# Patient Record
Sex: FEMALE | Race: WHITE | NOT HISPANIC OR LATINO | Employment: OTHER | URBAN - NONMETROPOLITAN AREA
[De-identification: names, ages, dates, MRNs, and addresses within clinical notes are randomized per-mention and may not be internally consistent; named-entity substitution may affect disease eponyms.]

---

## 2017-09-20 ENCOUNTER — OFFICE VISIT (OUTPATIENT)
Dept: SURGERY | Facility: CLINIC | Age: 52
End: 2017-09-20

## 2017-09-20 VITALS
HEART RATE: 72 BPM | WEIGHT: 168 LBS | DIASTOLIC BLOOD PRESSURE: 80 MMHG | BODY MASS INDEX: 29.77 KG/M2 | RESPIRATION RATE: 16 BRPM | SYSTOLIC BLOOD PRESSURE: 122 MMHG | HEIGHT: 63 IN

## 2017-09-20 DIAGNOSIS — R07.9 CHEST PAIN, UNSPECIFIED TYPE: ICD-10-CM

## 2017-09-20 DIAGNOSIS — R14.0 BLOATING: ICD-10-CM

## 2017-09-20 DIAGNOSIS — R10.13 EPIGASTRIC PAIN: ICD-10-CM

## 2017-09-20 DIAGNOSIS — R10.11 RUQ PAIN: Primary | ICD-10-CM

## 2017-09-20 DIAGNOSIS — R19.7 DIARRHEA, UNSPECIFIED TYPE: ICD-10-CM

## 2017-09-20 PROCEDURE — 99204 OFFICE O/P NEW MOD 45 MIN: CPT | Performed by: SURGERY

## 2017-09-20 RX ORDER — MELOXICAM 15 MG/1
15 TABLET ORAL DAILY
COMMUNITY

## 2017-09-20 RX ORDER — GABAPENTIN 300 MG/1
600 CAPSULE ORAL 3 TIMES DAILY
COMMUNITY

## 2017-09-20 RX ORDER — ALBUTEROL SULFATE 90 UG/1
2 AEROSOL, METERED RESPIRATORY (INHALATION) EVERY 4 HOURS PRN
COMMUNITY

## 2017-09-20 RX ORDER — OXYCODONE HYDROCHLORIDE AND ACETAMINOPHEN 5; 325 MG/1; MG/1
1 TABLET ORAL EVERY 6 HOURS PRN
COMMUNITY

## 2017-09-20 RX ORDER — CHLORAL HYDRATE 500 MG
CAPSULE ORAL
COMMUNITY

## 2017-09-20 RX ORDER — CALCIUM CARBONATE 300MG(750)
TABLET,CHEWABLE ORAL
COMMUNITY

## 2017-09-20 RX ORDER — NITROGLYCERIN 0.4 MG/1
0.4 TABLET SUBLINGUAL
COMMUNITY

## 2017-09-20 RX ORDER — TRIAMTERENE AND HYDROCHLOROTHIAZIDE 75; 50 MG/1; MG/1
1 TABLET ORAL DAILY
COMMUNITY

## 2017-09-20 RX ORDER — METOPROLOL TARTRATE 50 MG/1
50 TABLET, FILM COATED ORAL 2 TIMES DAILY
COMMUNITY

## 2017-09-20 NOTE — PROGRESS NOTES
"Marya Campos 52 y.o. female presents @ the req of Dr. Hester for eval of dysphagia, bloating, heartburn, and reflux.  Pt reports no c-scope for greater than 10 years.        HPI     This very pleasant patient has been having multiple problems.  She has been having postprandial abdominal pain and bloating.  She also has diarrhea for many years.  She has been having swallowing.  She feels like food gets stuck in her epigastric area.  She also has issues with chest pain.  She had some sort of heart test that revealed a large number of irregular heart beats.  She has severe heartburn.  She has not seen a cardiologist in 2-3 years.  She also gets headaches from a pinched nerve in her neck.    Review of Systems   All other systems reviewed and are negative.          Past Medical History:   Diagnosis Date   • DDD (degenerative disc disease), cervical    • GERD (gastroesophageal reflux disease)    • Headache    • Hyperlipidemia    • Hypertension    • Seasonal allergies            Past Surgical History:   Procedure Laterality Date   • COLONOSCOPY     • ENDOSCOPY     • HYSTERECTOMY             Physical Exam   Constitutional: She is oriented to person, place, and time. She appears well-developed and well-nourished.   HENT:   Head: Normocephalic and atraumatic.   Neck: Neck supple.   Cardiovascular: Normal rate and regular rhythm.    Pulmonary/Chest: Effort normal and breath sounds normal.   Abdominal: Soft. Bowel sounds are normal.   ruq and epigastric pain   Musculoskeletal: She exhibits no edema.   Neurological: She is alert and oriented to person, place, and time.   Skin: Skin is warm and dry.   Psychiatric: She has a normal mood and affect. Her behavior is normal.   Nursing note and vitals reviewed.          /80  Pulse 72  Resp 16  Ht 63\" (160 cm)  Wt 168 lb (76.2 kg)  BMI 29.76 kg/m2        Marya was seen today for difficulty swallowing.    Diagnoses and all orders for this visit:    RUQ pain    Epigastric " pain    Bloating    Chest pain, unspecified type    Diarrhea, unspecified type      We will schedule Marya for an EGD, colonoscopy, gallbladder ultrasound and have her see Dr. King for cardiac clearance.  I discussed with the patient the benefits and risks of performing endoscopy.  Benefits and risks were not limited to but including bleeding, infection, perforation, complications of anesthesia, aspiration.  The patient appeared to understand and is willing to proceed.    Thank you for allowing me to participate in the care of this interesting patient.

## 2017-09-20 NOTE — H&P
"Marya Capmos 52 y.o. female presents @ the req of Dr. Hester for eval of dysphagia, bloating, heartburn, and reflux.  Pt reports no c-scope for greater than 10 years.        HPI     This very pleasant patient has been having multiple problems.  She has been having postprandial abdominal pain and bloating.  She also has diarrhea for many years.  She has been having swallowing.  She feels like food gets stuck in her epigastric area.  She also has issues with chest pain.  She had some sort of heart test that revealed a large number of irregular heart beats.  She has severe heartburn.  She has not seen a cardiologist in 2-3 years.  She also gets headaches from a pinched nerve in her neck.    Review of Systems   All other systems reviewed and are negative.          Past Medical History:   Diagnosis Date   • DDD (degenerative disc disease), cervical    • GERD (gastroesophageal reflux disease)    • Headache    • Hyperlipidemia    • Hypertension    • Seasonal allergies            Past Surgical History:   Procedure Laterality Date   • COLONOSCOPY     • ENDOSCOPY     • HYSTERECTOMY             Physical Exam   Constitutional: She is oriented to person, place, and time. She appears well-developed and well-nourished.   HENT:   Head: Normocephalic and atraumatic.   Neck: Neck supple.   Cardiovascular: Normal rate and regular rhythm.    Pulmonary/Chest: Effort normal and breath sounds normal.   Abdominal: Soft. Bowel sounds are normal.   ruq and epigastric pain   Musculoskeletal: She exhibits no edema.   Neurological: She is alert and oriented to person, place, and time.   Skin: Skin is warm and dry.   Psychiatric: She has a normal mood and affect. Her behavior is normal.   Nursing note and vitals reviewed.          /80  Pulse 72  Resp 16  Ht 63\" (160 cm)  Wt 168 lb (76.2 kg)  BMI 29.76 kg/m2        Marya was seen today for difficulty swallowing.    Diagnoses and all orders for this visit:    RUQ pain    Epigastric " pain    Bloating    Chest pain, unspecified type    Diarrhea, unspecified type      We will schedule Marya for an EGD, colonoscopy, gallbladder ultrasound and have her see Dr. King for cardiac clearance.  I discussed with the patient the benefits and risks of performing endoscopy.  Benefits and risks were not limited to but including bleeding, infection, perforation, complications of anesthesia, aspiration.  The patient appeared to understand and is willing to proceed.    Thank you for allowing me to participate in the care of this interesting patient.

## 2017-09-21 DIAGNOSIS — R10.11 RUQ PAIN: Primary | ICD-10-CM

## 2017-09-26 ENCOUNTER — OFFICE VISIT (OUTPATIENT)
Dept: CARDIOLOGY | Facility: CLINIC | Age: 52
End: 2017-09-26

## 2017-09-26 VITALS
BODY MASS INDEX: 29.77 KG/M2 | DIASTOLIC BLOOD PRESSURE: 67 MMHG | WEIGHT: 168 LBS | HEART RATE: 82 BPM | SYSTOLIC BLOOD PRESSURE: 102 MMHG | HEIGHT: 63 IN

## 2017-09-26 DIAGNOSIS — R07.2 PRECORDIAL PAIN: Primary | ICD-10-CM

## 2017-09-26 DIAGNOSIS — I45.9 SKIPPED BEATS: ICD-10-CM

## 2017-09-26 PROCEDURE — 99203 OFFICE O/P NEW LOW 30 MIN: CPT | Performed by: INTERNAL MEDICINE

## 2017-09-26 PROCEDURE — 93000 ELECTROCARDIOGRAM COMPLETE: CPT | Performed by: INTERNAL MEDICINE

## 2017-09-26 NOTE — PROGRESS NOTES
" Subjective:        CC  Clearance for scope    Skip beats    Occasional cp         Marya Campos is a 52 y.o. female who  Is here for the cardiac evaluation as well as clearance for the scopes has been complaining of skipped beats as well as occasional L aching sensations mild in intensity retrosternal with mild shortness of breath intermittent for several months Cardiac risk factors: hypertension and smoking/ tobacco exposure.    Past Medical History:   Diagnosis Date   • DDD (degenerative disc disease), cervical    • GERD (gastroesophageal reflux disease)    • Headache    • Hyperlipidemia    • Hypertension    • Seasonal allergies     reports that she has been smoking Cigarettes.  She has never used smokeless tobacco. She reports that she does not drink alcohol.  Family History   Problem Relation Age of Onset   • Cancer Mother    • Heart disease Maternal Uncle    • Hypertension Maternal Grandfather         Review of Systems  Constitutional: No wt loss, fever   Gastrointestinal: No nausea , abdominal pain  Behavioral/Psych: No insomnia or anxiety  Cardiovascular ----positive for Palpitation. All other systems reviewed and are negative    Objective:       Physical Exam             Physical Exam  /67  Pulse 82  Ht 63\" (160 cm)  Wt 168 lb (76.2 kg)  BMI 29.76 kg/m2    General appearance: NAD, conversant   Eyes: anicteric sclerae, moist conjunctivae; no lid-lag; PERRLA   HENT: Atraumatic; oropharynx clear with moist mucous membranes and no mucosal ulcerations;  normal hard and soft palate   Neck: Trachea midline; FROM, supple, no thyromegaly or lymphadenopathy   Lungs: CTA, with normal respiratory effort and no intercostal retractions   CV: S1-S2 regular, no murmurs, no rub, no gallop   Abdomen: Soft, non-tender; no masses or HSM   Extremities: No peripheral edema or extremity lymphadenopathy  Skin: Normal temperature, turgor and texture; no rash, ulcers or subcutaneous nodules   Psych: Appropriate affect, alert " and oriented to person, place and time           Cardiographics  @  ECG 12 Lead  Date/Time: 9/26/2017 4:30 PM  Performed by: LUCERO DOVE  Authorized by: LUCERO DOVE   Comparison: not compared with previous ECG   Previous ECG: no previous ECG available  Rhythm: sinus rhythm  ST Flattening: all  Clinical impression: non-specific ECG            Echocardiogram:     Imaging  Chest x-ray: not done     Lab Review   not applicable       Current Outpatient Prescriptions:   •  albuterol (PROVENTIL HFA;VENTOLIN HFA) 108 (90 Base) MCG/ACT inhaler, Inhale 2 puffs Every 4 (Four) Hours As Needed for Wheezing., Disp: , Rfl:   •  esomeprazole (nexIUM) 20 MG capsule, Take 20 mg by mouth Every Morning Before Breakfast., Disp: , Rfl:   •  estrogens, conjugated, (PREMARIN) 0.9 MG tablet, Take 0.9 mg by mouth Daily. Take daily for 21 days then do not take for 7 days., Disp: , Rfl:   •  gabapentin (NEURONTIN) 300 MG capsule, Take 600 mg by mouth 3 (Three) Times a Day., Disp: , Rfl:   •  Magnesium 400 MG tablet, Take  by mouth. One tab every two days, Disp: , Rfl:   •  meloxicam (MOBIC) 15 MG tablet, Take 15 mg by mouth Daily., Disp: , Rfl:   •  metoprolol tartrate (LOPRESSOR) 50 MG tablet, Take 50 mg by mouth 2 (Two) Times a Day., Disp: , Rfl:   •  NIACIN CR PO, Take 1,000 mg by mouth., Disp: , Rfl:   •  nitroglycerin (NITROSTAT) 0.4 MG SL tablet, Place 0.4 mg under the tongue Every 5 (Five) Minutes As Needed for Chest Pain. Take no more than 3 doses in 15 minutes., Disp: , Rfl:   •  Omega-3 Fatty Acids (FISH OIL) 1000 MG capsule capsule, Take  by mouth Daily With Breakfast., Disp: , Rfl:   •  oxyCODONE-acetaminophen (PERCOCET) 5-325 MG per tablet, Take 1 tablet by mouth Every 6 (Six) Hours As Needed., Disp: , Rfl:   •  Potassium Chloride Brit CR (POTASSIUM CHLORIDE BRIT ER PO), Take 10 mEq by mouth., Disp: , Rfl:   •  triamterene-hydrochlorothiazide (MAXZIDE) 75-50 MG per tablet, Take 1 tablet by mouth Daily., Disp: ,  Rfl:         Assessment:      No diagnosis found.    Patient Active Problem List   Diagnosis   • Bloating   • Epigastric pain   • RUQ pain   • Chest pain   • Diarrhea   CONCLUSION(S):   1. Angiographically normal coronaries.  2. Preserved LV systolic function.    RECOMMENDATION(S): Medical therapy, continued low dose beta blocker for   palpitations      Plan:          1. Precordial pain  Considering the patient's symptoms as well as clinical situation and  EKG findings, along with cardiac risk factors, ischemic workup is necessary to rule out ischemic cardiomyopathy, stress induced arrhythmias, and functional capacity for diagnosis as well as prognostic consideration    - ECG 12 Lead  - Treadmill Stress Test  - Adult Transthoracic Echo Complete W/ Cont if Necessary Per Protocol    2. Skipped beats  Considering patient's medical condition as well as the risk factors, patient will require echocardiogram for further evaluation for the LV function, four-chamber evaluation, including the pressures, valvular function and  pericardial disease and pericardial effusion    - Treadmill Stress Test  - Adult Transthoracic Echo Complete W/ Cont if Necessary Per Protocol    Needs ETT/ ECHO    SEE US 1 MONTH        Counseling was given to Marya Campos for the following topics:  risk factor reductions, prognosis and risks and benefits of treatment options .       SMOKING COUNSELING:    Ready to quit: No  Counseling given: Yes      .  EMR Dragon/Transcription disclaimer:   Much of this encounter note is an electronic transcription/translation of spoken language to printed text. The electronic translation of spoken language may permit erroneous, or at times, nonsensical words or phrases to be inadvertently transcribed; Although I have reviewed the note for such errors, some may still exist.

## 2017-09-28 ENCOUNTER — TELEPHONE (OUTPATIENT)
Dept: SURGERY | Facility: CLINIC | Age: 52
End: 2017-09-28

## 2017-09-28 NOTE — TELEPHONE ENCOUNTER
Pt called and cx'd EGD/C-SCOPE stating she saw cardiologist and he ordered stress test and echo and she doesn't think she needs either test.  Offered to resched procedures and pt declined.  Enc pt to call if she changes her mind.     SG sched notified.  Dr. Huddleston notified.

## 2017-09-30 PROBLEM — I45.9 SKIPPED BEATS: Status: ACTIVE | Noted: 2017-09-30

## 2017-10-10 ENCOUNTER — APPOINTMENT (OUTPATIENT)
Dept: ULTRASOUND IMAGING | Facility: HOSPITAL | Age: 52
End: 2017-10-10
Attending: SURGERY

## 2025-08-10 ENCOUNTER — ANESTHESIA EVENT (OUTPATIENT)
Dept: PERIOP | Facility: HOSPITAL | Age: 60
End: 2025-08-10
Payer: MEDICARE

## 2025-08-10 ENCOUNTER — APPOINTMENT (OUTPATIENT)
Dept: CT IMAGING | Facility: HOSPITAL | Age: 60
End: 2025-08-10
Payer: MEDICARE

## 2025-08-10 ENCOUNTER — HOSPITAL ENCOUNTER (OUTPATIENT)
Facility: HOSPITAL | Age: 60
Discharge: HOME | End: 2025-08-11
Admitting: STUDENT IN AN ORGANIZED HEALTH CARE EDUCATION/TRAINING PROGRAM
Payer: MEDICARE

## 2025-08-10 ENCOUNTER — ANESTHESIA (OUTPATIENT)
Dept: PERIOP | Facility: HOSPITAL | Age: 60
End: 2025-08-10
Payer: MEDICARE

## 2025-08-10 DIAGNOSIS — K37 APPENDICITIS: ICD-10-CM

## 2025-08-10 DIAGNOSIS — K35.80 ACUTE APPENDICITIS, UNSPECIFIED ACUTE APPENDICITIS TYPE: Primary | ICD-10-CM

## 2025-08-10 LAB
ALBUMIN SERPL-MCNC: 4 G/DL (ref 3.5–5.2)
ALBUMIN/GLOB SERPL: 1.3 G/DL
ALP SERPL-CCNC: 114 U/L (ref 39–117)
ALT SERPL W P-5'-P-CCNC: 46 U/L (ref 1–33)
ANION GAP SERPL CALCULATED.3IONS-SCNC: 12.3 MMOL/L (ref 5–15)
AST SERPL-CCNC: 46 U/L (ref 1–32)
BACTERIA UR QL AUTO: ABNORMAL /HPF
BASOPHILS # BLD AUTO: 0.07 10*3/MM3 (ref 0–0.2)
BASOPHILS NFR BLD AUTO: 0.5 % (ref 0–1.5)
BILIRUB SERPL-MCNC: 0.3 MG/DL (ref 0–1.2)
BILIRUB UR QL STRIP: ABNORMAL
BUN SERPL-MCNC: 23.2 MG/DL (ref 8–23)
BUN/CREAT SERPL: 21.5 (ref 7–25)
CALCIUM SPEC-SCNC: 9.5 MG/DL (ref 8.6–10.5)
CHLORIDE SERPL-SCNC: 101 MMOL/L (ref 98–107)
CLARITY UR: CLEAR
CO2 SERPL-SCNC: 22.7 MMOL/L (ref 22–29)
COLOR UR: YELLOW
CREAT SERPL-MCNC: 1.08 MG/DL (ref 0.57–1)
DEPRECATED RDW RBC AUTO: 50 FL (ref 37–54)
EGFRCR SERPLBLD CKD-EPI 2021: 58.2 ML/MIN/1.73
EOSINOPHIL # BLD AUTO: 0.02 10*3/MM3 (ref 0–0.4)
EOSINOPHIL NFR BLD AUTO: 0.1 % (ref 0.3–6.2)
ERYTHROCYTE [DISTWIDTH] IN BLOOD BY AUTOMATED COUNT: 13.2 % (ref 12.3–15.4)
GLOBULIN UR ELPH-MCNC: 3 GM/DL
GLUCOSE SERPL-MCNC: 107 MG/DL (ref 65–99)
GLUCOSE UR STRIP-MCNC: NEGATIVE MG/DL
HCT VFR BLD AUTO: 36.7 % (ref 34–46.6)
HGB BLD-MCNC: 12.3 G/DL (ref 12–15.9)
HGB UR QL STRIP.AUTO: NEGATIVE
HYALINE CASTS UR QL AUTO: ABNORMAL /LPF
IMM GRANULOCYTES # BLD AUTO: 0.05 10*3/MM3 (ref 0–0.05)
IMM GRANULOCYTES NFR BLD AUTO: 0.4 % (ref 0–0.5)
KETONES UR QL STRIP: NEGATIVE
LEUKOCYTE ESTERASE UR QL STRIP.AUTO: ABNORMAL
LYMPHOCYTES # BLD AUTO: 1.36 10*3/MM3 (ref 0.7–3.1)
LYMPHOCYTES NFR BLD AUTO: 9.8 % (ref 19.6–45.3)
MCH RBC QN AUTO: 34.8 PG (ref 26.6–33)
MCHC RBC AUTO-ENTMCNC: 33.5 G/DL (ref 31.5–35.7)
MCV RBC AUTO: 104 FL (ref 79–97)
MONOCYTES # BLD AUTO: 1.31 10*3/MM3 (ref 0.1–0.9)
MONOCYTES NFR BLD AUTO: 9.5 % (ref 5–12)
NEUTROPHILS NFR BLD AUTO: 11.05 10*3/MM3 (ref 1.7–7)
NEUTROPHILS NFR BLD AUTO: 79.7 % (ref 42.7–76)
NITRITE UR QL STRIP: NEGATIVE
NRBC BLD AUTO-RTO: 0 /100 WBC (ref 0–0.2)
PH UR STRIP.AUTO: 5.5 [PH] (ref 4.5–8)
PLATELET # BLD AUTO: 208 10*3/MM3 (ref 140–450)
PMV BLD AUTO: 10.4 FL (ref 6–12)
POTASSIUM SERPL-SCNC: 3.9 MMOL/L (ref 3.5–5.2)
PROT SERPL-MCNC: 7 G/DL (ref 6–8.5)
PROT UR QL STRIP: ABNORMAL
RBC # BLD AUTO: 3.53 10*6/MM3 (ref 3.77–5.28)
RBC # UR STRIP: ABNORMAL /HPF
REF LAB TEST METHOD: ABNORMAL
SODIUM SERPL-SCNC: 136 MMOL/L (ref 136–145)
SP GR UR STRIP: 1.02 (ref 1–1.03)
SQUAMOUS #/AREA URNS HPF: ABNORMAL /HPF
UROBILINOGEN UR QL STRIP: ABNORMAL
WBC # UR STRIP: ABNORMAL /HPF
WBC NRBC COR # BLD AUTO: 13.86 10*3/MM3 (ref 3.4–10.8)

## 2025-08-10 PROCEDURE — 25010000002 FAMOTIDINE 10 MG/ML SOLUTION: Performed by: ANESTHESIOLOGY

## 2025-08-10 PROCEDURE — 96375 TX/PRO/DX INJ NEW DRUG ADDON: CPT

## 2025-08-10 PROCEDURE — 36415 COLL VENOUS BLD VENIPUNCTURE: CPT

## 2025-08-10 PROCEDURE — 80053 COMPREHEN METABOLIC PANEL: CPT

## 2025-08-10 PROCEDURE — 25010000002 MORPHINE PER 10 MG

## 2025-08-10 PROCEDURE — 81001 URINALYSIS AUTO W/SCOPE: CPT

## 2025-08-10 PROCEDURE — G0378 HOSPITAL OBSERVATION PER HR: HCPCS

## 2025-08-10 PROCEDURE — 63710000001 HYDROCODONE-ACETAMINOPHEN 5-325 MG TABLET: Performed by: STUDENT IN AN ORGANIZED HEALTH CARE EDUCATION/TRAINING PROGRAM

## 2025-08-10 PROCEDURE — 44970 LAPAROSCOPY APPENDECTOMY: CPT | Performed by: STUDENT IN AN ORGANIZED HEALTH CARE EDUCATION/TRAINING PROGRAM

## 2025-08-10 PROCEDURE — 96365 THER/PROPH/DIAG IV INF INIT: CPT

## 2025-08-10 PROCEDURE — 94761 N-INVAS EAR/PLS OXIMETRY MLT: CPT

## 2025-08-10 PROCEDURE — 25010000002 SUCCINYLCHOLINE PER 20 MG: Performed by: NURSE ANESTHETIST, CERTIFIED REGISTERED

## 2025-08-10 PROCEDURE — 63710000001 NICOTINE 21 MG/24HR PATCH 24 HOUR: Performed by: STUDENT IN AN ORGANIZED HEALTH CARE EDUCATION/TRAINING PROGRAM

## 2025-08-10 PROCEDURE — A9270 NON-COVERED ITEM OR SERVICE: HCPCS | Performed by: STUDENT IN AN ORGANIZED HEALTH CARE EDUCATION/TRAINING PROGRAM

## 2025-08-10 PROCEDURE — 25010000002 METRONIDAZOLE 500 MG/100ML SOLUTION

## 2025-08-10 PROCEDURE — 25010000002 SUGAMMADEX 200 MG/2ML SOLUTION: Performed by: NURSE ANESTHETIST, CERTIFIED REGISTERED

## 2025-08-10 PROCEDURE — 25010000002 CEFTRIAXONE PER 250 MG

## 2025-08-10 PROCEDURE — 85025 COMPLETE CBC W/AUTO DIFF WBC: CPT

## 2025-08-10 PROCEDURE — 25810000003 LACTATED RINGERS PER 1000 ML: Performed by: ANESTHESIOLOGY

## 2025-08-10 PROCEDURE — 63710000001 HYDROCODONE-ACETAMINOPHEN 5-325 MG TABLET: Performed by: NURSE ANESTHETIST, CERTIFIED REGISTERED

## 2025-08-10 PROCEDURE — 25010000002 DEXAMETHASONE PER 1 MG: Performed by: ANESTHESIOLOGY

## 2025-08-10 PROCEDURE — 94640 AIRWAY INHALATION TREATMENT: CPT

## 2025-08-10 PROCEDURE — 88304 TISSUE EXAM BY PATHOLOGIST: CPT | Performed by: STUDENT IN AN ORGANIZED HEALTH CARE EDUCATION/TRAINING PROGRAM

## 2025-08-10 PROCEDURE — 74177 CT ABD & PELVIS W/CONTRAST: CPT

## 2025-08-10 PROCEDURE — 96368 THER/DIAG CONCURRENT INF: CPT

## 2025-08-10 PROCEDURE — 99285 EMERGENCY DEPT VISIT HI MDM: CPT

## 2025-08-10 PROCEDURE — 25010000002 HYDROMORPHONE 1 MG/ML SOLUTION: Performed by: STUDENT IN AN ORGANIZED HEALTH CARE EDUCATION/TRAINING PROGRAM

## 2025-08-10 PROCEDURE — 25510000001 IOPAMIDOL PER 1 ML

## 2025-08-10 PROCEDURE — 25010000002 PROPOFOL 10 MG/ML EMULSION: Performed by: NURSE ANESTHETIST, CERTIFIED REGISTERED

## 2025-08-10 PROCEDURE — A9270 NON-COVERED ITEM OR SERVICE: HCPCS | Performed by: NURSE ANESTHETIST, CERTIFIED REGISTERED

## 2025-08-10 PROCEDURE — 25010000002 MIDAZOLAM PER 1MG: Performed by: ANESTHESIOLOGY

## 2025-08-10 PROCEDURE — 25010000002 LIDOCAINE 2% SOLUTION: Performed by: NURSE ANESTHETIST, CERTIFIED REGISTERED

## 2025-08-10 PROCEDURE — 25010000002 ONDANSETRON PER 1 MG: Performed by: ANESTHESIOLOGY

## 2025-08-10 PROCEDURE — 25010000002 FENTANYL CITRATE (PF) 50 MCG/ML SOLUTION: Performed by: NURSE ANESTHETIST, CERTIFIED REGISTERED

## 2025-08-10 DEVICE — THE ECHELON, ECHELON ENDOPATH™ AND ECHELON FLEX™ FAMILIES OF ENDOSCOPIC LINEAR CUTTERS AND RELOADS ARE STERILE, SINGLE PATIENT USE INSTRUMENTS THAT SIMULTANEOUSLY CUT AND STAPLE TISSUE. THERE ARE SIX STAGGERED ROWS OF STAPLES, THREE ON EITHER SIDE OF THE CUT LINE. THE 45 MM INSTRUMENTS HAVE A STAPLE LINE THATIS APPROXIMATELY 45 MM LONG AND A CUT LINE THAT IS APPROXIMATELY 42 MM LONG. THE SHAFT CAN ROTATE FREELY IN BOTH DIRECTIONS AND AN ARTICULATION MECHANISM ON ARTICULATING INSTRUMENTS ENABLES BENDING THE DISTAL PORTIONOF THE SHAFT TO FACILITATE LATERAL ACCESS OF THE OPERATIVE SITE.THE INSTRUMENTS ARE SHIPPED WITHOUT A RELOAD AND MUST BE LOADED PRIOR TO USE. A STAPLE RETAINING CAP ON THE RELOAD PROTECTS THE STAPLE LEG POINTS DURING SHIPPING AND TRANSPORTATION. THE INSTRUMENTS’ LOCK-OUT FEATURE IS DESIGNED TO PREVENT A USED RELOAD FROM BEING REFIRED.
Type: IMPLANTABLE DEVICE | Site: ABDOMEN | Status: FUNCTIONAL
Brand: ECHELON ENDOPATH

## 2025-08-10 RX ORDER — BUPIVACAINE HCL/EPINEPHRINE 0.5-1:200K
VIAL (ML) INJECTION AS NEEDED
Status: DISCONTINUED | OUTPATIENT
Start: 2025-08-10 | End: 2025-08-10 | Stop reason: HOSPADM

## 2025-08-10 RX ORDER — PRAVASTATIN SODIUM 40 MG
20 TABLET ORAL NIGHTLY
COMMUNITY
Start: 2025-08-01

## 2025-08-10 RX ORDER — MIDAZOLAM HYDROCHLORIDE 2 MG/2ML
1 INJECTION, SOLUTION INTRAMUSCULAR; INTRAVENOUS
Status: DISCONTINUED | OUTPATIENT
Start: 2025-08-10 | End: 2025-08-10 | Stop reason: HOSPADM

## 2025-08-10 RX ORDER — FENTANYL CITRATE 50 UG/ML
25 INJECTION, SOLUTION INTRAMUSCULAR; INTRAVENOUS
Status: DISCONTINUED | OUTPATIENT
Start: 2025-08-10 | End: 2025-08-10 | Stop reason: HOSPADM

## 2025-08-10 RX ORDER — SODIUM CHLORIDE 0.9 % (FLUSH) 0.9 %
10 SYRINGE (ML) INJECTION AS NEEDED
Status: DISCONTINUED | OUTPATIENT
Start: 2025-08-10 | End: 2025-08-11 | Stop reason: HOSPADM

## 2025-08-10 RX ORDER — SUCCINYLCHOLINE CHLORIDE 20 MG/ML
INJECTION INTRAMUSCULAR; INTRAVENOUS AS NEEDED
Status: DISCONTINUED | OUTPATIENT
Start: 2025-08-10 | End: 2025-08-10 | Stop reason: SURG

## 2025-08-10 RX ORDER — ONDANSETRON 2 MG/ML
4 INJECTION INTRAMUSCULAR; INTRAVENOUS ONCE AS NEEDED
Status: COMPLETED | OUTPATIENT
Start: 2025-08-10 | End: 2025-08-10

## 2025-08-10 RX ORDER — SODIUM CHLORIDE 0.9 % (FLUSH) 0.9 %
10 SYRINGE (ML) INJECTION AS NEEDED
Status: DISCONTINUED | OUTPATIENT
Start: 2025-08-10 | End: 2025-08-10 | Stop reason: HOSPADM

## 2025-08-10 RX ORDER — SODIUM CHLORIDE, SODIUM LACTATE, POTASSIUM CHLORIDE, CALCIUM CHLORIDE 600; 310; 30; 20 MG/100ML; MG/100ML; MG/100ML; MG/100ML
9 INJECTION, SOLUTION INTRAVENOUS CONTINUOUS
Status: DISCONTINUED | OUTPATIENT
Start: 2025-08-10 | End: 2025-08-11

## 2025-08-10 RX ORDER — PROPOFOL 10 MG/ML
VIAL (ML) INTRAVENOUS AS NEEDED
Status: DISCONTINUED | OUTPATIENT
Start: 2025-08-10 | End: 2025-08-10 | Stop reason: SURG

## 2025-08-10 RX ORDER — IOPAMIDOL 755 MG/ML
100 INJECTION, SOLUTION INTRAVASCULAR
Status: COMPLETED | OUTPATIENT
Start: 2025-08-10 | End: 2025-08-10

## 2025-08-10 RX ORDER — GABAPENTIN 300 MG/1
300 CAPSULE ORAL EVERY 8 HOURS SCHEDULED
Status: DISCONTINUED | OUTPATIENT
Start: 2025-08-10 | End: 2025-08-11 | Stop reason: HOSPADM

## 2025-08-10 RX ORDER — MAGNESIUM HYDROXIDE 1200 MG/15ML
LIQUID ORAL AS NEEDED
Status: DISCONTINUED | OUTPATIENT
Start: 2025-08-10 | End: 2025-08-10 | Stop reason: HOSPADM

## 2025-08-10 RX ORDER — ALBUTEROL SULFATE 0.83 MG/ML
2.5 SOLUTION RESPIRATORY (INHALATION) ONCE
Status: COMPLETED | OUTPATIENT
Start: 2025-08-10 | End: 2025-08-10

## 2025-08-10 RX ORDER — SODIUM CHLORIDE 0.9 % (FLUSH) 0.9 %
10 SYRINGE (ML) INJECTION EVERY 12 HOURS SCHEDULED
Status: DISCONTINUED | OUTPATIENT
Start: 2025-08-10 | End: 2025-08-10 | Stop reason: HOSPADM

## 2025-08-10 RX ORDER — EZETIMIBE 10 MG/1
10 TABLET ORAL
COMMUNITY
Start: 2025-07-24

## 2025-08-10 RX ORDER — ONDANSETRON 2 MG/ML
4 INJECTION INTRAMUSCULAR; INTRAVENOUS ONCE AS NEEDED
Status: DISCONTINUED | OUTPATIENT
Start: 2025-08-10 | End: 2025-08-10 | Stop reason: HOSPADM

## 2025-08-10 RX ORDER — METRONIDAZOLE 500 MG/100ML
500 INJECTION, SOLUTION INTRAVENOUS ONCE
Status: COMPLETED | OUTPATIENT
Start: 2025-08-10 | End: 2025-08-10

## 2025-08-10 RX ORDER — FAMOTIDINE 10 MG/ML
20 INJECTION, SOLUTION INTRAVENOUS
Status: COMPLETED | OUTPATIENT
Start: 2025-08-10 | End: 2025-08-10

## 2025-08-10 RX ORDER — NICOTINE 21 MG/24HR
1 PATCH, TRANSDERMAL 24 HOURS TRANSDERMAL
Status: DISCONTINUED | OUTPATIENT
Start: 2025-08-10 | End: 2025-08-11 | Stop reason: HOSPADM

## 2025-08-10 RX ORDER — SODIUM CHLORIDE 9 MG/ML
40 INJECTION, SOLUTION INTRAVENOUS AS NEEDED
Status: DISCONTINUED | OUTPATIENT
Start: 2025-08-10 | End: 2025-08-10 | Stop reason: HOSPADM

## 2025-08-10 RX ORDER — OMEPRAZOLE 20 MG/1
20 CAPSULE, DELAYED RELEASE ORAL DAILY
COMMUNITY
End: 2025-08-11 | Stop reason: HOSPADM

## 2025-08-10 RX ORDER — HYDROCODONE BITARTRATE AND ACETAMINOPHEN 5; 325 MG/1; MG/1
1 TABLET ORAL EVERY 6 HOURS PRN
Refills: 0 | Status: DISCONTINUED | OUTPATIENT
Start: 2025-08-10 | End: 2025-08-11 | Stop reason: HOSPADM

## 2025-08-10 RX ORDER — LIDOCAINE HYDROCHLORIDE 10 MG/ML
0.5 INJECTION, SOLUTION EPIDURAL; INFILTRATION; INTRACAUDAL; PERINEURAL ONCE AS NEEDED
Status: DISCONTINUED | OUTPATIENT
Start: 2025-08-10 | End: 2025-08-10 | Stop reason: HOSPADM

## 2025-08-10 RX ORDER — ROCURONIUM BROMIDE 10 MG/ML
INJECTION, SOLUTION INTRAVENOUS AS NEEDED
Status: DISCONTINUED | OUTPATIENT
Start: 2025-08-10 | End: 2025-08-10 | Stop reason: SURG

## 2025-08-10 RX ORDER — FENTANYL CITRATE 50 UG/ML
INJECTION, SOLUTION INTRAMUSCULAR; INTRAVENOUS AS NEEDED
Status: DISCONTINUED | OUTPATIENT
Start: 2025-08-10 | End: 2025-08-10 | Stop reason: SURG

## 2025-08-10 RX ORDER — CYCLOBENZAPRINE HCL 10 MG
10 TABLET ORAL 2 TIMES DAILY PRN
COMMUNITY
Start: 2025-07-24

## 2025-08-10 RX ORDER — ONDANSETRON 2 MG/ML
4 INJECTION INTRAMUSCULAR; INTRAVENOUS EVERY 6 HOURS PRN
Status: DISCONTINUED | OUTPATIENT
Start: 2025-08-10 | End: 2025-08-11 | Stop reason: HOSPADM

## 2025-08-10 RX ORDER — LIDOCAINE HYDROCHLORIDE 20 MG/ML
INJECTION, SOLUTION INFILTRATION; PERINEURAL AS NEEDED
Status: DISCONTINUED | OUTPATIENT
Start: 2025-08-10 | End: 2025-08-10 | Stop reason: SURG

## 2025-08-10 RX ORDER — DIPHENHYDRAMINE HYDROCHLORIDE 50 MG/ML
12.5 INJECTION, SOLUTION INTRAMUSCULAR; INTRAVENOUS
Status: DISCONTINUED | OUTPATIENT
Start: 2025-08-10 | End: 2025-08-10 | Stop reason: HOSPADM

## 2025-08-10 RX ORDER — SODIUM CHLORIDE 0.9 % (FLUSH) 0.9 %
10 SYRINGE (ML) INJECTION EVERY 12 HOURS SCHEDULED
Status: DISCONTINUED | OUTPATIENT
Start: 2025-08-10 | End: 2025-08-11 | Stop reason: HOSPADM

## 2025-08-10 RX ORDER — DEXAMETHASONE SODIUM PHOSPHATE 4 MG/ML
8 INJECTION, SOLUTION INTRA-ARTICULAR; INTRALESIONAL; INTRAMUSCULAR; INTRAVENOUS; SOFT TISSUE ONCE AS NEEDED
Status: COMPLETED | OUTPATIENT
Start: 2025-08-10 | End: 2025-08-10

## 2025-08-10 RX ORDER — SODIUM CHLORIDE 9 MG/ML
40 INJECTION, SOLUTION INTRAVENOUS AS NEEDED
Status: DISCONTINUED | OUTPATIENT
Start: 2025-08-10 | End: 2025-08-11 | Stop reason: HOSPADM

## 2025-08-10 RX ORDER — DEXTROSE MONOHYDRATE AND SODIUM CHLORIDE 5; .45 G/100ML; G/100ML
100 INJECTION, SOLUTION INTRAVENOUS CONTINUOUS
Status: DISCONTINUED | OUTPATIENT
Start: 2025-08-10 | End: 2025-08-11 | Stop reason: HOSPADM

## 2025-08-10 RX ORDER — HYDROCODONE BITARTRATE AND ACETAMINOPHEN 5; 325 MG/1; MG/1
1 TABLET ORAL ONCE AS NEEDED
Status: COMPLETED | OUTPATIENT
Start: 2025-08-10 | End: 2025-08-10

## 2025-08-10 RX ADMIN — METRONIDAZOLE 500 MG: 500 INJECTION, SOLUTION INTRAVENOUS at 14:59

## 2025-08-10 RX ADMIN — FENTANYL CITRATE 50 MCG: 50 INJECTION, SOLUTION INTRAMUSCULAR; INTRAVENOUS at 16:55

## 2025-08-10 RX ADMIN — FAMOTIDINE 20 MG: 10 INJECTION INTRAVENOUS at 16:26

## 2025-08-10 RX ADMIN — ONDANSETRON 4 MG: 2 INJECTION INTRAMUSCULAR; INTRAVENOUS at 16:26

## 2025-08-10 RX ADMIN — SODIUM CHLORIDE 1000 MG: 9 INJECTION, SOLUTION INTRAVENOUS at 15:01

## 2025-08-10 RX ADMIN — Medication 10 ML: at 15:39

## 2025-08-10 RX ADMIN — ROCURONIUM BROMIDE 25 MG: 10 INJECTION INTRAVENOUS at 17:02

## 2025-08-10 RX ADMIN — SUCCINYLCHOLINE CHLORIDE 140 MG: 20 INJECTION, SOLUTION INTRAMUSCULAR; INTRAVENOUS at 16:55

## 2025-08-10 RX ADMIN — HYDROCODONE BITARTRATE AND ACETAMINOPHEN 1 TABLET: 5; 325 TABLET ORAL at 23:51

## 2025-08-10 RX ADMIN — MORPHINE SULFATE 4 MG: 4 INJECTION, SOLUTION INTRAMUSCULAR; INTRAVENOUS at 13:17

## 2025-08-10 RX ADMIN — HYDROCODONE BITARTRATE AND ACETAMINOPHEN 1 TABLET: 5; 325 TABLET ORAL at 18:35

## 2025-08-10 RX ADMIN — Medication 10 ML: at 20:15

## 2025-08-10 RX ADMIN — HYDROMORPHONE HYDROCHLORIDE 0.5 MG: 1 INJECTION, SOLUTION INTRAMUSCULAR; INTRAVENOUS; SUBCUTANEOUS at 19:55

## 2025-08-10 RX ADMIN — LIDOCAINE HYDROCHLORIDE 80 MG: 20 INJECTION, SOLUTION INFILTRATION; PERINEURAL at 16:55

## 2025-08-10 RX ADMIN — SODIUM CHLORIDE, POTASSIUM CHLORIDE, SODIUM LACTATE AND CALCIUM CHLORIDE 9 ML/HR: 600; 310; 30; 20 INJECTION, SOLUTION INTRAVENOUS at 16:26

## 2025-08-10 RX ADMIN — PROPOFOL 150 MG: 10 INJECTION, EMULSION INTRAVENOUS at 16:55

## 2025-08-10 RX ADMIN — ALBUTEROL SULFATE 2.5 MG: 2.5 SOLUTION RESPIRATORY (INHALATION) at 16:26

## 2025-08-10 RX ADMIN — ROCURONIUM BROMIDE 5 MG: 10 INJECTION INTRAVENOUS at 16:55

## 2025-08-10 RX ADMIN — NICOTINE 1 PATCH: 21 PATCH, EXTENDED RELEASE TRANSDERMAL at 19:50

## 2025-08-10 RX ADMIN — MIDAZOLAM HYDROCHLORIDE 1 MG: 1 INJECTION, SOLUTION INTRAMUSCULAR; INTRAVENOUS at 16:55

## 2025-08-10 RX ADMIN — IOPAMIDOL 100 ML: 755 INJECTION, SOLUTION INTRAVENOUS at 13:53

## 2025-08-10 RX ADMIN — SUGAMMADEX 200 MG: 100 INJECTION, SOLUTION INTRAVENOUS at 18:09

## 2025-08-10 RX ADMIN — DEXTROSE AND SODIUM CHLORIDE 100 ML/HR: 5; 450 INJECTION, SOLUTION INTRAVENOUS at 19:50

## 2025-08-10 RX ADMIN — ROCURONIUM BROMIDE 10 MG: 10 INJECTION INTRAVENOUS at 17:30

## 2025-08-10 RX ADMIN — DEXAMETHASONE SODIUM PHOSPHATE 8 MG: 4 INJECTION, SOLUTION INTRAMUSCULAR; INTRAVENOUS at 16:26

## 2025-08-11 VITALS
DIASTOLIC BLOOD PRESSURE: 59 MMHG | RESPIRATION RATE: 18 BRPM | SYSTOLIC BLOOD PRESSURE: 119 MMHG | WEIGHT: 160 LBS | HEIGHT: 63 IN | TEMPERATURE: 98 F | BODY MASS INDEX: 28.35 KG/M2 | HEART RATE: 77 BPM | OXYGEN SATURATION: 93 %

## 2025-08-11 LAB
ANION GAP SERPL CALCULATED.3IONS-SCNC: 14.4 MMOL/L (ref 5–15)
BUN SERPL-MCNC: 21 MG/DL (ref 8–23)
BUN/CREAT SERPL: 23.3 (ref 7–25)
CALCIUM SPEC-SCNC: 9.2 MG/DL (ref 8.6–10.5)
CHLORIDE SERPL-SCNC: 101 MMOL/L (ref 98–107)
CO2 SERPL-SCNC: 18.6 MMOL/L (ref 22–29)
CREAT SERPL-MCNC: 0.9 MG/DL (ref 0.57–1)
DEPRECATED RDW RBC AUTO: 51.5 FL (ref 37–54)
EGFRCR SERPLBLD CKD-EPI 2021: 72.4 ML/MIN/1.73
ERYTHROCYTE [DISTWIDTH] IN BLOOD BY AUTOMATED COUNT: 13.2 % (ref 12.3–15.4)
GLUCOSE SERPL-MCNC: 180 MG/DL (ref 65–99)
HCT VFR BLD AUTO: 37.2 % (ref 34–46.6)
HGB BLD-MCNC: 12 G/DL (ref 12–15.9)
MCH RBC QN AUTO: 34.3 PG (ref 26.6–33)
MCHC RBC AUTO-ENTMCNC: 32.3 G/DL (ref 31.5–35.7)
MCV RBC AUTO: 106.3 FL (ref 79–97)
PLATELET # BLD AUTO: 179 10*3/MM3 (ref 140–450)
PMV BLD AUTO: 11.3 FL (ref 6–12)
POTASSIUM SERPL-SCNC: 4.5 MMOL/L (ref 3.5–5.2)
RBC # BLD AUTO: 3.5 10*6/MM3 (ref 3.77–5.28)
SODIUM SERPL-SCNC: 134 MMOL/L (ref 136–145)
WBC NRBC COR # BLD AUTO: 10.98 10*3/MM3 (ref 3.4–10.8)

## 2025-08-11 PROCEDURE — A9270 NON-COVERED ITEM OR SERVICE: HCPCS | Performed by: STUDENT IN AN ORGANIZED HEALTH CARE EDUCATION/TRAINING PROGRAM

## 2025-08-11 PROCEDURE — 63710000001 GABAPENTIN 300 MG CAPSULE: Performed by: STUDENT IN AN ORGANIZED HEALTH CARE EDUCATION/TRAINING PROGRAM

## 2025-08-11 PROCEDURE — 85027 COMPLETE CBC AUTOMATED: CPT | Performed by: STUDENT IN AN ORGANIZED HEALTH CARE EDUCATION/TRAINING PROGRAM

## 2025-08-11 PROCEDURE — G0378 HOSPITAL OBSERVATION PER HR: HCPCS

## 2025-08-11 PROCEDURE — 80048 BASIC METABOLIC PNL TOTAL CA: CPT | Performed by: STUDENT IN AN ORGANIZED HEALTH CARE EDUCATION/TRAINING PROGRAM

## 2025-08-11 PROCEDURE — 63710000001 NICOTINE 21 MG/24HR PATCH 24 HOUR: Performed by: STUDENT IN AN ORGANIZED HEALTH CARE EDUCATION/TRAINING PROGRAM

## 2025-08-11 RX ORDER — HYDROCODONE BITARTRATE AND ACETAMINOPHEN 5; 325 MG/1; MG/1
1 TABLET ORAL EVERY 6 HOURS PRN
Qty: 8 TABLET | Refills: 0 | Status: SHIPPED | OUTPATIENT
Start: 2025-08-11 | End: 2025-08-15

## 2025-08-11 RX ADMIN — GABAPENTIN 300 MG: 300 CAPSULE ORAL at 06:02

## 2025-08-11 RX ADMIN — Medication 10 ML: at 08:42

## 2025-08-11 RX ADMIN — NICOTINE 1 PATCH: 21 PATCH, EXTENDED RELEASE TRANSDERMAL at 08:43

## 2025-08-12 ENCOUNTER — READMISSION MANAGEMENT (OUTPATIENT)
Dept: CALL CENTER | Facility: HOSPITAL | Age: 60
End: 2025-08-12
Payer: MEDICARE

## 2025-08-12 LAB
CYTO UR: NORMAL
LAB AP CASE REPORT: NORMAL
PATH REPORT.FINAL DX SPEC: NORMAL
PATH REPORT.GROSS SPEC: NORMAL

## 2025-08-18 ENCOUNTER — READMISSION MANAGEMENT (OUTPATIENT)
Dept: CALL CENTER | Facility: HOSPITAL | Age: 60
End: 2025-08-18
Payer: MEDICARE

## 2025-08-19 ENCOUNTER — LAB (OUTPATIENT)
Dept: LAB | Facility: HOSPITAL | Age: 60
End: 2025-08-19
Payer: MEDICARE

## 2025-08-19 ENCOUNTER — OFFICE VISIT (OUTPATIENT)
Dept: SURGERY | Facility: CLINIC | Age: 60
End: 2025-08-19
Payer: MEDICARE

## 2025-08-19 ENCOUNTER — TELEPHONE (OUTPATIENT)
Dept: SURGERY | Facility: CLINIC | Age: 60
End: 2025-08-19
Payer: MEDICARE

## 2025-08-19 ENCOUNTER — RESULTS FOLLOW-UP (OUTPATIENT)
Dept: LAB | Facility: HOSPITAL | Age: 60
End: 2025-08-19
Payer: MEDICARE

## 2025-08-19 VITALS
DIASTOLIC BLOOD PRESSURE: 70 MMHG | OXYGEN SATURATION: 95 % | BODY MASS INDEX: 29.38 KG/M2 | HEART RATE: 84 BPM | WEIGHT: 165.8 LBS | SYSTOLIC BLOOD PRESSURE: 128 MMHG | HEIGHT: 63 IN

## 2025-08-19 DIAGNOSIS — Z48.89 POSTOPERATIVE VISIT: Primary | ICD-10-CM

## 2025-08-19 DIAGNOSIS — R30.0 DYSURIA: ICD-10-CM

## 2025-08-19 LAB
BACTERIA UR QL AUTO: ABNORMAL /HPF
BILIRUB UR QL STRIP: NEGATIVE
CLARITY UR: CLEAR
COLOR UR: YELLOW
GLUCOSE UR STRIP-MCNC: NEGATIVE MG/DL
HGB UR QL STRIP.AUTO: NEGATIVE
HYALINE CASTS UR QL AUTO: ABNORMAL /LPF
KETONES UR QL STRIP: NEGATIVE
LEUKOCYTE ESTERASE UR QL STRIP.AUTO: ABNORMAL
NITRITE UR QL STRIP: NEGATIVE
PH UR STRIP.AUTO: 5.5 [PH] (ref 5–8)
PROT UR QL STRIP: NEGATIVE
RBC # UR STRIP: ABNORMAL /HPF
REF LAB TEST METHOD: ABNORMAL
SP GR UR STRIP: 1.02 (ref 1–1.03)
SQUAMOUS #/AREA URNS HPF: ABNORMAL /HPF
UROBILINOGEN UR QL STRIP: ABNORMAL
WBC # UR STRIP: ABNORMAL /HPF

## 2025-08-19 PROCEDURE — 99024 POSTOP FOLLOW-UP VISIT: CPT

## 2025-08-19 PROCEDURE — 81001 URINALYSIS AUTO W/SCOPE: CPT

## 2025-08-19 PROCEDURE — 1159F MED LIST DOCD IN RCRD: CPT

## 2025-08-19 PROCEDURE — 1160F RVW MEDS BY RX/DR IN RCRD: CPT

## 2025-08-20 ENCOUNTER — TELEPHONE (OUTPATIENT)
Dept: SURGERY | Facility: CLINIC | Age: 60
End: 2025-08-20
Payer: MEDICARE

## (undated) DEVICE — ENDOPATH XCEL BLADELESS TROCARS WITH STABILITY SLEEVES: Brand: ENDOPATH XCEL

## (undated) DEVICE — ECHELON FLEX45 ENDOPATH STAPLER, ARTICULATING ENDOSCOPIC LINEAR CUTTER (NO CARTRIDGE): Brand: ECHELON ENDOPATH

## (undated) DEVICE — 2, DISPOSABLE SUCTION/IRRIGATOR WITH DISPOSABLE TIP: Brand: STRYKEFLOW

## (undated) DEVICE — TRAP FLD MINIVAC MEGADYNE 100ML

## (undated) DEVICE — PK LAP GEN 90

## (undated) DEVICE — APPL CHLORAPREP HI/LITE 26ML ORNG

## (undated) DEVICE — TRANSFER SET 3": Brand: MEDLINE INDUSTRIES, INC.

## (undated) DEVICE — GLV SURG SENSICARE PI LF PF 6.5

## (undated) DEVICE — SUT VIC 0 UR6 27IN VCP603H

## (undated) DEVICE — ENDOCUT SCISSOR TIP, DISPOSABLE: Brand: RENEW

## (undated) DEVICE — ENDOPOUCH RETRIEVER SPECIMEN RETRIEVAL BAGS: Brand: ENDOPOUCH RETRIEVER

## (undated) DEVICE — HARMONIC 700 SHEARS, ADVANCED HEMOSTASIS: Brand: HARMONIC

## (undated) DEVICE — ENDOPATH XCEL UNIVERSAL TROCAR STABLILITY SLEEVES: Brand: ENDOPATH XCEL

## (undated) DEVICE — ANTIBACTERIAL UNDYED BRAIDED (POLYGLACTIN 910), SYNTHETIC ABSORBABLE SUTURE: Brand: COATED VICRYL

## (undated) DEVICE — EXOFIN PRECISION PEN HIGH VISCOSITY TOPICAL SKIN ADHESIVE: Brand: EXOFIN PRECISION PEN, 1G

## (undated) DEVICE — INSUFFLATION TUBING SET, ENDOFLATOR 50: Brand: N.A.

## (undated) DEVICE — LAPAROSCOPIC SMOKE FILTRATION SYSTEM: Brand: PALL LAPAROSHIELD® PLUS LAPAROSCOPIC SMOKE FILTRATION SYSTEM

## (undated) DEVICE — SOL IRR H2O BO 1000ML STRL